# Patient Record
Sex: MALE | Race: WHITE | Employment: FULL TIME | URBAN - METROPOLITAN AREA
[De-identification: names, ages, dates, MRNs, and addresses within clinical notes are randomized per-mention and may not be internally consistent; named-entity substitution may affect disease eponyms.]

---

## 2020-10-28 ENCOUNTER — APPOINTMENT (OUTPATIENT)
Dept: GENERAL RADIOLOGY | Age: 50
End: 2020-10-28
Payer: COMMERCIAL

## 2020-10-28 ENCOUNTER — APPOINTMENT (OUTPATIENT)
Dept: CT IMAGING | Age: 50
End: 2020-10-28
Payer: COMMERCIAL

## 2020-10-28 ENCOUNTER — HOSPITAL ENCOUNTER (EMERGENCY)
Age: 50
Discharge: HOME | End: 2020-10-28
Attending: EMERGENCY MEDICINE
Payer: COMMERCIAL

## 2020-10-28 VITALS
HEART RATE: 70 BPM | OXYGEN SATURATION: 99 % | WEIGHT: 228 LBS | DIASTOLIC BLOOD PRESSURE: 88 MMHG | HEIGHT: 70 IN | BODY MASS INDEX: 32.64 KG/M2 | RESPIRATION RATE: 18 BRPM | TEMPERATURE: 98.2 F | SYSTOLIC BLOOD PRESSURE: 124 MMHG

## 2020-10-28 PROCEDURE — 70486 CT MAXILLOFACIAL W/O DYE: CPT

## 2020-10-28 PROCEDURE — 73110 X-RAY EXAM OF WRIST: CPT

## 2020-10-28 PROCEDURE — 73590 X-RAY EXAM OF LOWER LEG: CPT

## 2020-10-28 PROCEDURE — 90471 IMMUNIZATION ADMIN: CPT | Performed by: EMERGENCY MEDICINE

## 2020-10-28 PROCEDURE — 99283 EMERGENCY DEPT VISIT LOW MDM: CPT

## 2020-10-28 PROCEDURE — 90715 TDAP VACCINE 7 YRS/> IM: CPT | Performed by: EMERGENCY MEDICINE

## 2020-10-28 PROCEDURE — 73560 X-RAY EXAM OF KNEE 1 OR 2: CPT

## 2020-10-28 PROCEDURE — 6360000002 HC RX W HCPCS: Performed by: EMERGENCY MEDICINE

## 2020-10-28 PROCEDURE — 6370000000 HC RX 637 (ALT 250 FOR IP): Performed by: EMERGENCY MEDICINE

## 2020-10-28 RX ORDER — ACETAMINOPHEN 325 MG/1
650 TABLET ORAL ONCE
Status: COMPLETED | OUTPATIENT
Start: 2020-10-28 | End: 2020-10-28

## 2020-10-28 RX ORDER — ATENOLOL 25 MG/1
25 TABLET ORAL DAILY
COMMUNITY
Start: 2020-07-10

## 2020-10-28 RX ORDER — FOLIC ACID 1 MG/1
1 TABLET ORAL DAILY
COMMUNITY
Start: 2020-07-31 | End: 2021-07-31

## 2020-10-28 RX ADMIN — TETANUS TOXOID, REDUCED DIPHTHERIA TOXOID AND ACELLULAR PERTUSSIS VACCINE, ADSORBED 0.5 ML: 5; 2.5; 8; 8; 2.5 SUSPENSION INTRAMUSCULAR at 09:14

## 2020-10-28 RX ADMIN — ACETAMINOPHEN 650 MG: 325 TABLET ORAL at 09:15

## 2020-10-28 ASSESSMENT — ENCOUNTER SYMPTOMS
VOICE CHANGE: 0
NAUSEA: 0
STRIDOR: 0
VOMITING: 0
ABDOMINAL PAIN: 0
TROUBLE SWALLOWING: 0
BACK PAIN: 0
FACIAL SWELLING: 0
BLOOD IN STOOL: 0
WHEEZING: 0
PHOTOPHOBIA: 0
SHORTNESS OF BREATH: 0
COLOR CHANGE: 0

## 2020-10-28 ASSESSMENT — PAIN DESCRIPTION - ORIENTATION: ORIENTATION: LEFT

## 2020-10-28 ASSESSMENT — PAIN SCALES - GENERAL
PAINLEVEL_OUTOF10: 8
PAINLEVEL_OUTOF10: 8

## 2020-10-28 ASSESSMENT — PAIN DESCRIPTION - PAIN TYPE: TYPE: ACUTE PAIN

## 2020-10-28 ASSESSMENT — PAIN DESCRIPTION - DESCRIPTORS: DESCRIPTORS: THROBBING

## 2020-10-28 NOTE — ED PROVIDER NOTES
[facility]  eMERGENCY dEPARTMENT eNCOUnter      Pt Name: Tawanda Wells  MRN: [id number]  [place] 1970  Date of evaluation: 10/28/2020  Provider: Magalie Lopez MD    [address] [place] [address]       Chief Complaint   Patient presents with    Fall         HISTORY OF PRESENT ILLNESS   (Location/Symptom, Timing/Onset, Context/Setting, Quality, Duration, Modifying Factors, Severity)  Note limiting factors. Tawanda Wells is a 52 y.o. male who presents the emergency department shortly after suffering a fall. 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 He reports he landed on his left knee and bilateral arms. He reports his nose hit one of his arms but denies that his head or face made contact with the ground. He reports he did land on a gravel ground. He reports diffuse abrasions and pain. He is unaware of his last tetanus shot. He denies any loss of consciousness, vomiting, neck or back pain. He does report pain to his left knee and upper tib-fib, left wrist, and nose. Reports his pain is moderate to severe, throbbing, constant, and unchanged. Reports movement tactile pressure worsens his pain and nothing improves it. HPI    Nursing Notes were reviewed. REVIEW OFSYSTEMS    (2-9 systems for level 4, 10 or more for level 5)     Review of Systems   Constitutional: Negative for appetite change, fever and unexpected weight change. HENT: Negative for facial swelling, trouble swallowing and voice change. Eyes: Negative for photophobia and visual disturbance. Respiratory: Negative for shortness of breath, wheezing and stridor. Cardiovascular: Negative for chest pain and palpitations. Gastrointestinal: Negative for abdominal pain, blood in stool, nausea and vomiting. Genitourinary: Negative for difficulty urinating and dysuria.    Musculoskeletal: Positive for arthralgias. Negative for back pain, gait problem and neck pain. Skin: Positive for wound. Negative for color change. Neurological: Negative for seizures, syncope and speech difficulty. Psychiatric/Behavioral: Negative for self-injury and suicidal ideas. Except as noted above the remainder of the review of systems was reviewed and negative. PAST MEDICAL HISTORY   No past medical history on file. SURGICAL HISTORY       Past Surgical History:   Procedure Laterality Date    KNEE SURGERY      x2    NECK SURGERY      x2         CURRENT MEDICATIONS       Previous Medications    ATENOLOL (TENORMIN) 25 MG TABLET    Take 25 mg by mouth daily    FLUOXETINE HCL (PROZAC PO)    Take 20 mg by mouth daily    FOLIC ACID (FOLVITE) 1 MG TABLET    Take 1 mg by mouth daily       ALLERGIES     Patient has no known allergies. FAMILY HISTORY     No family history on file.        SOCIAL HISTORY       Social History     Socioeconomic History    Marital status:      Spouse name: Not on file    Number of children: Not on file    Years of education: Not on file    Highest education level: Not on file   Occupational History    Not on file   Social Needs    Financial resource strain: Not on file    Food insecurity     Worry: Not on file     Inability: Not on file    Transportation needs     Medical: Not on file     Non-medical: Not on file   Tobacco Use    Smoking status: Never Smoker    Smokeless tobacco: Never Used   Substance and Sexual Activity    Alcohol use: Yes     Comment: social    Drug use: Never    Sexual activity: Not on file   Lifestyle    Physical activity     Days per week: Not on file     Minutes per session: Not on file    Stress: Not on file   Relationships    Social connections     Talks on phone: Not on file     Gets together: Not on file     Attends [employer] service: Not on file     Active member of club or organization: Not on file     Attends meetings of clubs or organizations: Not on file     Relationship status: Not on file    Intimate partner violence     Fear of current or ex partner: Not on file     Emotionally abused: Not on file     Physically abused: Not on file     Forced sexual activity: Not on file   Other Topics Concern    Not on file   Social History Narrative    Not on file         PHYSICAL EXAM    (up to 7 for level 4, 8 or more for level 5)     ED Triage Vitals [10/28/20 0818]   BP Temp Temp Source Pulse Resp SpO2 Height Weight   (!) 121/90 98.2 °F (36.8 °C) Oral 71 18 99 % 5' 10\" (1.778 m) 228 lb (103.4 kg)       Physical Exam  Vitals signs and nursing note reviewed. Constitutional:       General: He is not in acute distress. Appearance: He is well-developed. HENT:      Head: Normocephalic. Comments: Mild swelling and tenderness to the bilateral nares. No nasal septal hematoma. No active bleeding. No raccoon sign, reeves sign, or hemotympanum. No scalp hematomas. No depressible skull fractures. Right Ear: External ear normal.      Left Ear: External ear normal.   Eyes:      Conjunctiva/sclera: Conjunctivae normal.   Neck:      Musculoskeletal: Normal range of motion and neck supple. No neck rigidity or muscular tenderness. Vascular: No JVD. Trachea: No tracheal deviation. Comments: No midline tenderness to palpation. Full range of motion of neck with no pain. Cardiovascular:      Rate and Rhythm: Normal rate. Pulses: Normal pulses. Comments: Intact distal pulses diffusely. Pulmonary:      Effort: Pulmonary effort is normal. No respiratory distress. Breath sounds: Normal breath sounds. No wheezing. Abdominal:      General: There is no distension. Palpations: Abdomen is soft. Tenderness: There is no abdominal tenderness. There is no guarding or rebound. Musculoskeletal: Normal range of motion. General: Tenderness and signs of injury present.       Comments: Mild tenderness and abrasions to the left anterior knee and the left anterior proximal tib-fib. No full-thickness lacerations. No active bleeding. No signs of acute infection or foreign bodies. Mild abrasions to dorsal right forearm without any pain with axial load and no tenderness to palpation. Mild diffuse tenderness to left wrist without any overlying abrasion. Skin:     General: Skin is warm and dry. Neurological:      General: No focal deficit present. Mental Status: He is alert and oriented to person, place, and time. Cranial Nerves: No cranial nerve deficit. Comments: 5 out of 5 strength all 4 extremities. Sensation intact in all 4 extremities. Normal gait on own power. DIAGNOSTIC RESULTS       RADIOLOGY:     Interpretation per the Radiologist below, if available at the time of this note:    XR WRIST LEFT (MIN 3 VIEWS)   Final Result   Negative. XR TIBIA FIBULA LEFT (2 VIEWS)   Preliminary Result   1. No acute osseous abnormality of the left knee or the left tibia/fibula   2. Prior ACL reconstruction         XR KNEE LEFT (1-2 VIEWS)   Preliminary Result   1. No acute osseous abnormality of the left knee or the left tibia/fibula   2. Prior ACL reconstruction         CT FACIAL BONES WO CONTRAST   Final Result   No acute traumatic injury of the facial bones. ED BEDSIDE ULTRASOUND:   Performed by ED Physician - none    LABS:  Labs Reviewed - No data to display    All otherlabs were within normal range or not returned as of this dictation. EMERGENCY DEPARTMENT COURSE and DIFFERENTIAL DIAGNOSIS/MDM:   Vitals:    Vitals:    10/28/20 0818   BP: (!) 121/90   Pulse: 71   Resp: 18   Temp: 98.2 °F (36.8 °C)   TempSrc: Oral   SpO2: 99%   Weight: 228 lb (103.4 kg)   Height: 5' 10\" (1.778 m)         MDM  Traumatic imaging taken the emergency department does not show any acute emergent injuries which are evident on the imaging techniques are performed.   We have discussed the possibility of getting right arm x-rays patient declines I feel it is reasonable given his lack of pain with axial load. Wounds are cleaned and standard wound care instructions were discussed with him. The patient does have diffuse pain to the left wrist including not limited to the anatomical snuffbox. I have discussed with him that scaphoid fractures do not always show up on x-rays and therefore he is given a Velcro left wrist splint and advised to keep this on until he is cleared by occupational health clinic. The importance of occupational health clinic follow-up within 1 week for reevaluation stressed to the patient. Strict ER return precautions given for new or worsening symptoms. The patient expresses understanding and agreement with this plan and is discharged home. Procedures    FINAL IMPRESSION      1. Fall, initial encounter    2. Abrasions of multiple sites    3. Left wrist injury, initial encounter          DISPOSITION/PLAN   DISPOSITION Decision To Discharge 10/28/2020 09:34:22 AM      PATIENT REFERRED TO:  [address], [address] [facility] [place] [address]  [phone]    In 1 week  Ask for workers comp clinic for fall, left wrist injury, diffuse abrasions    [facility] Emergency Department  [address] [place] [address],[address] [phone]    If symptoms worsen           (Please note that portions of this note were completed with a voice recognition program.  Efforts were made to edit the dictations but occasionally words aremis-transcribed. )    Monster Nunez MD (electronically signed)  Attending Emergency Physician           Monster Nunez MD  10/28/20 [id number]

## 2020-10-28 NOTE — ED TRIAGE NOTES
Patient arrives to the ED via private car after a reported 12 foot fall at work from the back of a trailer. Denies LOC. Alert and oriented. No visual signs of distress. Abrasion noted to the RUE. Edema to the left hand. Abrasion and pain to the left knee. Edema to the nose with reported pain.

## 2020-10-28 NOTE — LETTER
[facility] Emergency Department  [address] [id number]  Phone: [phone]               October 28, 2020    Patient: Lamberto Sal   YOB: 1970   Date of Visit: 10/28/2020       To Whom It May Concern:    Lamberto Sal was seen and treated in our emergency department on 10/28/2020.  He may return to work on 11/01/2020      Sincerely,               Signature:__________________________________